# Patient Record
Sex: MALE | Race: WHITE | NOT HISPANIC OR LATINO | Employment: OTHER | URBAN - METROPOLITAN AREA
[De-identification: names, ages, dates, MRNs, and addresses within clinical notes are randomized per-mention and may not be internally consistent; named-entity substitution may affect disease eponyms.]

---

## 2017-07-31 ENCOUNTER — TELEPHONE (OUTPATIENT)
Dept: PAIN MEDICINE | Facility: CLINIC | Age: 62
End: 2017-07-31

## 2017-07-31 NOTE — TELEPHONE ENCOUNTER
Spoke with patient let him know we have not received referral. Patient states he will check with his pcp

## 2017-07-31 NOTE — TELEPHONE ENCOUNTER
----- Message from Melanie Guillory sent at 7/31/2017  7:26 AM CDT -----  Contact: pt  Pt requests the nurse to call him at [phone] to inform him if the referral has been received.

## 2018-02-27 ENCOUNTER — TELEPHONE (OUTPATIENT)
Dept: PODIATRY | Facility: CLINIC | Age: 63
End: 2018-02-27

## 2018-02-27 ENCOUNTER — OFFICE VISIT (OUTPATIENT)
Dept: PODIATRY | Facility: CLINIC | Age: 63
End: 2018-02-27
Payer: MEDICARE

## 2018-02-27 VITALS — WEIGHT: 159.63 LBS | SYSTOLIC BLOOD PRESSURE: 116 MMHG | DIASTOLIC BLOOD PRESSURE: 68 MMHG | HEART RATE: 63 BPM

## 2018-02-27 DIAGNOSIS — I77.1 ARTERIAL INSUFFICIENCY: ICD-10-CM

## 2018-02-27 DIAGNOSIS — M79.671 RIGHT FOOT PAIN: Primary | ICD-10-CM

## 2018-02-27 DIAGNOSIS — S99.929A INJURY OF NAIL BED OF TOE: ICD-10-CM

## 2018-02-27 PROCEDURE — 99213 OFFICE O/P EST LOW 20 MIN: CPT | Mod: PBBFAC,PO | Performed by: PODIATRIST

## 2018-02-27 PROCEDURE — 99999 PR PBB SHADOW E&M-EST. PATIENT-LVL III: CPT | Mod: PBBFAC,,, | Performed by: PODIATRIST

## 2018-02-27 PROCEDURE — 99203 OFFICE O/P NEW LOW 30 MIN: CPT | Mod: S$PBB,,, | Performed by: PODIATRIST

## 2018-02-27 RX ORDER — CALCIUM CARBONATE 200(500)MG
TABLET,CHEWABLE ORAL
COMMUNITY

## 2018-02-27 RX ORDER — AMLODIPINE BESYLATE 5 MG/1
10 TABLET ORAL
COMMUNITY

## 2018-02-27 RX ORDER — OXYBUTYNIN CHLORIDE 5 MG/1
TABLET ORAL
Refills: 3 | COMMUNITY
Start: 2017-11-29

## 2018-02-27 RX ORDER — SEVELAMER HYDROCHLORIDE 800 MG/1
800 TABLET, FILM COATED ORAL
COMMUNITY

## 2018-02-27 RX ORDER — CARVEDILOL 25 MG/1
TABLET ORAL
Refills: 1 | COMMUNITY
Start: 2018-01-07

## 2018-02-27 NOTE — TELEPHONE ENCOUNTER
----- Message from Nely Goncalves sent at 2/27/2018  7:35 AM CST -----  Please call pt back at [phone] about his appt. Thanks

## 2018-02-27 NOTE — PROGRESS NOTES
[facility]  PODIATRIC MEDICINE AND SURGERY  PROGRESS NOTE  2/27/2018    PODIATRY NOTE  PCP: Dr. Arnaldo Padilla MD    CHIEF COMPLAINT   Chief Complaint   Patient presents with    Nail Problem     Left hallux nail injury. Injury occurred about 2 weeks ago. Has been performing warm epsom salt soaks and applying antibiotic ointment to site. Nail removed.    Foot Problem     Dark red area dorsal right foot foot. States area is tender at times and has grown in size since he noticed it.       HPI  Lisandro Kelley is a 63 y.o. male who has a past medical history of Disorder of kidney and ureter and Hypertension.   Lisandro presents to clinic today complaining of injury to left great toe resulting in nail plate falling off. He denies any pain. States injury occurred about 2 weeks ago. He has been performing epsom salt soaks. He also presents with complaints of scab on top of right foot. States he had open wound which healed over but formed a scab. Area is tender with direct pressure. He is a smoker. He also has ESRD on HD. His PCP is Dr. Padilla which he routinely sees.     Patient denies other pedal complaints at this time.     PMH  Past Medical History:   Diagnosis Date    Disorder of kidney and ureter     Hypertension        PROBLEM LIST  There are no active problems to display for this patient.      MEDS  No current outpatient prescriptions on file prior to visit.     No current facility-administered medications on file prior to visit.        Medication List with Changes/Refills   Current Medications    AMLODIPINE (NORVASC) 5 MG TABLET    Take 10 mg by mouth.    CALCIUM CARBONATE (TUMS) 200 MG CALCIUM (500 MG) CHEWABLE TABLET    Take by mouth.    CARVEDILOL (COREG) 25 MG TABLET    TK 1 T PO QD    MULTIVIT-MINERALS-FERROUS FUM 9 MG IRON/15 ML LIQD    Take by mouth.    OXYBUTYNIN (DITROPAN) 5 MG TAB    TK 1 T PO TID    SEVELAMER HCL (RENAGEL) 800 MG TAB    Take 800 mg by mouth.       PSH   No past  surgical history on file.     ALL  Review of patient's allergies indicates:   Allergen Reactions    Acetaminophen      Pt states he is not allergic to Tylenol but was told by the doctor not to take it due to liver issues       SOC     Social History   Substance Use Topics    Smoking status: Current Every Day Smoker    Smokeless tobacco: Never Used    Alcohol use Not on file         FAMILY HX  No family history on file.         REVIEW OF SYSTEMS  Constitutional: Negative for chills and fever.   Respiratory: Negative for shortness of breath.    Cardiovascular: Negative for chest pain, palpitations, orthopnea  Gastrointestinal: Negative for diarrhea, nausea and vomiting.   Musculoskeletal: Negative for joint pain.    Skin: Negative for rash.   Neurological: Negative for dizziness, tingling, sensory change, focal weakness and weakness.   Psychiatric/Behavioral: Negative.    PHYSICAL EXAM  Vitals:    02/27/18 0949   BP: 116/68   Pulse: 63   Weight: 72.4 kg (159 lb 9.8 oz)   PainSc: 0-No pain       General: This patient is well-developed, well-nourished and appears stated age, well-oriented to person, place and time, and cooperative and pleasant on today's visit    LOWER EXTREMITY  Vascular exam:   · Dorsalis pedis and posterior tibial pulses palpable 1/4 bilaterally.   · Capillary refill time immediate to the toes.   · Feet are warm to the touch. Skin temperature warm to warm from proximally to distally   · There are varicosities, telangiectasias noted to bilateral foot and ankle regions.   · There are no ecchymoses noted to bilateral foot and ankle regions.   · There is gross lower extremity edema.    Dermatologic exam:   · Skin moist with healthy texture and turgor.  · There are no open ulcerations, lacerations, or fissures to bilateral foot and ankle regions. There are no signs of infection as there is no erythema, no proximal-extending lymphangiitis, no fluctuance, or crepitus noted on palpation to bilateral foot  and ankle regions.   · Clean nail bed LEFT hallux  · There is eschar overlying small wound at dorsum of 2nd metatarsal RIGHT foot  · There is no interdigital maceration.   · There are no hyperkeratotic lesions noted to feet. Nails are well-trimmed.    Neurologic exam:  · Epicritic sensation is intact as the patient is able to sense light touch to bilateral foot and ankle regions.   · Achilles and patellar deep tendon reflexes intact  · Babinski reflex absent    Musculoskeletal/Orthopedic exam:   · No symptomatic structural abnormalities noted  · Muscle strength AT/EHL/EDL/PT: 5/5; Achilles/Gastroc/Soleus: 5/5; PB/PL: 5/5 Muscle tone is normal.  · Ankle joint ROM  B/L supple DF/PF, non-crepitus  · STJ ROM supple inv/ev, non crepitus       ASSESSMENT  Encounter Diagnoses   Name Primary?    Right foot pain Yes    Arterial insufficiency     Injury of nail bed of toe - Left Foot      PLAN  1. Patient was educated about clinical and imaging findings, and verbalizes understanding of above.     Diagnoses and all orders for this visit:  Right foot pain    Arterial insufficiency  -     US Lower Extremity Arteries Bilateral; Future; Expected date: 02/27/2018    Injury of nail bed of toe - Left Foot      2. Treatment plan: nail bed appears clean, dry, no acute SOI. Monitor site. Patient informed nail growth rate. In regards to scab/slow healing wound on RIGHT foot- wound is closed and no acute SOI. Will order ultrasound for further evaluation due to arterial insufficiency          Future Appointments  Date Time Provider Department Center   3/5/2018 3:30 PM [facility] US1 [facility]       Report Electronically Signed By:  Tiffany Wang DPM   Podiatric Medicine & Surgery  [facility]  2/27/2018

## 2018-03-05 ENCOUNTER — HOSPITAL ENCOUNTER (OUTPATIENT)
Dept: RADIOLOGY | Facility: HOSPITAL | Age: 63
Discharge: HOME | End: 2018-03-05
Attending: PODIATRIST
Payer: MEDICARE

## 2018-03-05 DIAGNOSIS — I77.1 ARTERIAL INSUFFICIENCY: ICD-10-CM

## 2018-03-05 PROCEDURE — 93925 LOWER EXTREMITY STUDY: CPT | Mod: TC,PO

## 2018-03-05 PROCEDURE — 93925 LOWER EXTREMITY STUDY: CPT | Mod: 26,,, | Performed by: RADIOLOGY
